# Patient Record
Sex: FEMALE | Race: BLACK OR AFRICAN AMERICAN | NOT HISPANIC OR LATINO | Employment: STUDENT | URBAN - METROPOLITAN AREA
[De-identification: names, ages, dates, MRNs, and addresses within clinical notes are randomized per-mention and may not be internally consistent; named-entity substitution may affect disease eponyms.]

---
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
## 2022-01-06 ENCOUNTER — OFFICE VISIT (OUTPATIENT)
Dept: URGENT CARE | Age: 22
End: 2022-01-06
Payer: COMMERCIAL

## 2022-01-06 VITALS
HEART RATE: 88 BPM | OXYGEN SATURATION: 100 % | BODY MASS INDEX: 27.82 KG/M2 | RESPIRATION RATE: 16 BRPM | TEMPERATURE: 97.7 F | WEIGHT: 167 LBS | HEIGHT: 65 IN

## 2022-01-06 DIAGNOSIS — J06.9 VIRAL URI: Primary | ICD-10-CM

## 2022-01-06 PROCEDURE — U0003 INFECTIOUS AGENT DETECTION BY NUCLEIC ACID (DNA OR RNA); SEVERE ACUTE RESPIRATORY SYNDROME CORONAVIRUS 2 (SARS-COV-2) (CORONAVIRUS DISEASE [COVID-19]), AMPLIFIED PROBE TECHNIQUE, MAKING USE OF HIGH THROUGHPUT TECHNOLOGIES AS DESCRIBED BY CMS-2020-01-R: HCPCS | Performed by: FAMILY MEDICINE

## 2022-01-06 PROCEDURE — 99213 OFFICE O/P EST LOW 20 MIN: CPT | Performed by: FAMILY MEDICINE

## 2022-01-06 RX ORDER — BUPROPION HYDROCHLORIDE 100 MG/1
100 TABLET ORAL DAILY
COMMUNITY

## 2022-01-06 RX ORDER — PROPRANOLOL HYDROCHLORIDE 60 MG/1
60 TABLET ORAL 2 TIMES DAILY
COMMUNITY

## 2022-01-06 RX ORDER — BROMPHENIRAMINE MALEATE, PSEUDOEPHEDRINE HYDROCHLORIDE, AND DEXTROMETHORPHAN HYDROBROMIDE 2; 30; 10 MG/5ML; MG/5ML; MG/5ML
10 SYRUP ORAL 3 TIMES DAILY PRN
Qty: 200 ML | Refills: 0 | Status: SHIPPED | OUTPATIENT
Start: 2022-01-06 | End: 2022-03-25

## 2022-01-06 NOTE — PROGRESS NOTES
[facility]        NAME: Amna العراقي is a 25 y o  female  : 2000    MRN: [id number]  DATE: 2022  TIME: 4:10 PM    Assessment and Plan   Viral URI [J06 9]  1  Viral URI  COVID Only -Office Collect    brompheniramine-pseudoephedrine-DM 30-2-10 MG/5ML syrup         Patient Instructions     Bromfed sent to pharmacy today    COVID swab collected today, test results pending  COVID test results are available between 3 and 5 days  Your results will come through [address]  Check MyChart and call if needed for test results  Quarantine guidelines discussed  Bromfed prescribed for congestion  OTC supplements/medications discussed, including Flonase  Follow-up with PCP in the next 1-2 days for re-evaluation if symptoms continue or worsen  Go to the ED if any fevers, unable to stay hydrated, abdominal pain, chest pain, shortness of breath, wheezing, chest tightness, cough or cold symptoms, new or worsening symptoms or other concerning symptoms  Chief Complaint     Chief Complaint   Patient presents with    COVID-19     sinus H/A, mucus in throat, occas dizziness since 24  +Vacc, tried Aleve, Benadryl         History of Present Illness     Amna العراقي is a 25 y o  female presenting to the office today for upper respiratory complaints  Symptoms have been present for 5 days, and include sore throat, headache, and congestion  She has tried Ibuprofen and Benadryl for her symptoms, some relief  Sick contacts include: Family members tested positive for COVID  Recent travel: denies    Review of Systems     Review of Systems   Constitutional: Negative for chills, fatigue and fever  HENT: Positive for congestion, sinus pressure, sinus pain and sore throat  Negative for ear discharge, ear pain and postnasal drip  Eyes: Negative for pain, discharge and itching  Respiratory: Negative for cough, chest tightness and shortness of breath      Cardiovascular: Negative for chest pain and palpitations  Gastrointestinal: Negative for abdominal pain, diarrhea, nausea and vomiting  Endocrine: Negative  Genitourinary: Negative  Negative for difficulty urinating and dysuria  Musculoskeletal: Negative for arthralgias and myalgias  Skin: Negative  Negative for rash  Allergic/Immunologic: Negative  Neurological: Positive for headaches  Negative for dizziness, syncope, light-headedness and numbness  Hematological: Negative  Psychiatric/Behavioral: Negative  Negative for agitation  All other systems reviewed and are negative  Current Medications       Current Outpatient Medications:     buPROPion (WELLBUTRIN) 100 mg tablet, Take 100 mg by mouth 2 (two) times a day, Disp: , Rfl:     propranolol (INDERAL) 60 mg tablet, Take 60 mg by mouth 3 (three) times a day, Disp: , Rfl:     brompheniramine-pseudoephedrine-DM 30-2-10 MG/5ML syrup, Take 10 mL by mouth 3 (three) times a day as needed for cough or congestion, Disp: 200 mL, Rfl: 0    Current Allergies     Allergies as of 01/06/2022    (No Known Allergies)            The following portions of the patient's history were reviewed and updated as appropriate: allergies, current medications, past family history, past medical history, past social history, past surgical history and problem list      Past Medical History:   Diagnosis Date    Anxiety     COPD (chronic obstructive pulmonary disease) ([facility] [address] )        Past Surgical History:   Procedure Laterality Date    COSMETIC SURGERY         History reviewed  No pertinent family history  Medications have been verified  Objective     Pulse 88   Temp 97 7 °F (36 5 °C)   Resp 16   Ht 5' 5" (1 651 m)   Wt 75 8 kg (167 lb)   LMP  (LMP Unknown)   SpO2 100%   BMI 27 79 kg/m²   No LMP recorded (lmp unknown)  Patient has had an injection  Physical Exam     Physical Exam  Vitals reviewed  Constitutional:       General: She is not in acute distress       Appearance: Normal appearance  She is normal weight  She is not ill-appearing  HENT:      Head: Normocephalic and atraumatic  Right Ear: Tympanic membrane, ear canal and external ear normal  No middle ear effusion  Left Ear: Tympanic membrane, ear canal and external ear normal   No middle ear effusion  Nose: Congestion present  Right Sinus: Frontal sinus tenderness present  Left Sinus: Frontal sinus tenderness present  Mouth/Throat:      Mouth: Mucous membranes are moist       Pharynx: Oropharynx is clear  Posterior oropharyngeal erythema present  No oropharyngeal exudate  Tonsils: No tonsillar exudate  Eyes:      Extraocular Movements: Extraocular movements intact  Conjunctiva/sclera: Conjunctivae normal       Pupils: Pupils are equal, round, and reactive to light  Cardiovascular:      Rate and Rhythm: Normal rate and regular rhythm  Pulses: Normal pulses  Heart sounds: Normal heart sounds  No murmur heard  Pulmonary:      Effort: Pulmonary effort is normal  No respiratory distress  Breath sounds: Normal breath sounds  No wheezing  Abdominal:      General: Abdomen is flat  Bowel sounds are normal  There is no distension  Palpations: Abdomen is soft  Tenderness: There is no abdominal tenderness  There is no guarding  Genitourinary:     General: Normal vulva  Rectum: Normal    Musculoskeletal:         General: Normal range of motion  Cervical back: Normal range of motion and neck supple  No tenderness  Lymphadenopathy:      Cervical: Cervical adenopathy present  Skin:     General: Skin is warm and dry  Capillary Refill: Capillary refill takes less than 2 seconds  Neurological:      General: No focal deficit present  Mental Status: She is alert and oriented to person, place, and time  Mental status is at baseline  Psychiatric:         Mood and Affect: Mood normal          Behavior: Behavior normal          Thought Content:  Thought content normal          Judgment: Judgment normal

## 2022-01-09 LAB — SARS-COV-2 RNA RESP QL NAA+PROBE: NEGATIVE

## 2022-01-20 ENCOUNTER — OFFICE VISIT (OUTPATIENT)
Dept: URGENT CARE | Age: 22
End: 2022-01-20
Payer: COMMERCIAL

## 2022-01-20 VITALS
BODY MASS INDEX: 28.46 KG/M2 | SYSTOLIC BLOOD PRESSURE: 120 MMHG | HEART RATE: 88 BPM | WEIGHT: 170.8 LBS | TEMPERATURE: 98 F | DIASTOLIC BLOOD PRESSURE: 60 MMHG | OXYGEN SATURATION: 100 % | RESPIRATION RATE: 20 BRPM | HEIGHT: 65 IN

## 2022-01-20 DIAGNOSIS — H65.93 BILATERAL NON-SUPPURATIVE OTITIS MEDIA: Primary | ICD-10-CM

## 2022-01-20 DIAGNOSIS — H69.93 DISORDER OF BOTH EUSTACHIAN TUBES: ICD-10-CM

## 2022-01-20 PROCEDURE — 99213 OFFICE O/P EST LOW 20 MIN: CPT | Performed by: FAMILY MEDICINE

## 2022-01-20 RX ORDER — AMOXICILLIN AND CLAVULANATE POTASSIUM 875; 125 MG/1; MG/1
1 TABLET, FILM COATED ORAL EVERY 12 HOURS SCHEDULED
Qty: 20 TABLET | Refills: 0 | Status: SHIPPED | OUTPATIENT
Start: 2022-01-20 | End: 2022-01-30

## 2022-01-20 RX ORDER — PREDNISONE 50 MG/1
50 TABLET ORAL DAILY
Qty: 5 TABLET | Refills: 0 | Status: SHIPPED | OUTPATIENT
Start: 2022-01-20 | End: 2022-01-25

## 2022-01-21 NOTE — PATIENT INSTRUCTIONS
Ear/eustachian tube massage, as demonstrated, 2-3 minutes every 2-3 hours  No ibuprofen while taking prednisone  Follow up with PCP in 3-5 days  Proceed to  ER if symptoms worsen  Eustachian Tube Dysfunction   WHAT YOU NEED TO KNOW:   What is eustachian tube dysfunction (ETD)? ETD is a condition that prevents your eustachian tubes from opening properly  It can also cause them to become blocked  Eustachian tubes connect your middle ear to the back of your nose and throat  These tubes open and allow air to flow in and out when you sneeze, swallow, or yawn  What causes or increases my risk for ETD? ETD may be caused by swelling or buildup of mucus in your eustachian tubes  Pressure can build if you travel in an airplane or go scuba diving  Allergies, a cold, or a sinus infection can cause mucus to build up  The following can also increase your risk:  · Smoking cigarettes    · GERD, chronic sinus inflammation, or a tumor in your nose or throat    · An immune system disorder    · Sleeping on your stomach    · In children, long-term use of a bottle, going to , or a condition such as a cleft palate    What are the signs and symptoms of ETD? · Fullness or pressure in your ears    · Muffled hearing, or a feeling you are hearing under water or have clogged ears    · Pain in one or both ears    · Ringing in your ears    · Popping, crackling, or clicking feeling in your ears    · Trouble keeping your balance    How is ETD diagnosed? ETD is most common in children younger than 5 years  Adults with ETD may have had it since childhood  ETD can sometimes begin in adulthood, usually because of certain medical conditions that have developed  Your healthcare provider will ask about your symptoms and when they began  He or she will examine your ears, your nose, and the back of your throat  He or she may also do a hearing test   How is ETD treated? ETD may get better on its own without any treatment   If it continues, you may need any of the following:  · Swallow, yawn, or chew gum  to help open your eustachian tubes  Your healthcare provider may also recommend you blow with your mouth shut and your nostrils pinched closed  · Air pressure devices  push air into your nose and eustachian tubes to help relieve air pressure in your ear  · Treatment for allergies  such as decongestants, antihistamines, and nasal steroids may improve ETD  They may help decrease swelling of the eustachian tubes  · A myringotomy  is surgery to make a hole in your eardrum  The hole relieves pressure and lets fluid drain from your ear  A pressure equalizing (PE) tube may be used to keep the hole open and to help drain fluid  · Tuboplasty  is a procedure to widen your eustachian tubes  When should I call my doctor? · Your symptoms do not improve or get worse  · You have a fever  · You have any hearing loss  · You have questions or concerns about your condition or care  CARE AGREEMENT:   You have the right to help plan your care  Learn about your health condition and how it may be treated  Discuss treatment options with your healthcare providers to decide what care you want to receive  You always have the right to refuse treatment  The above information is an  only  It is not intended as medical advice for individual conditions or treatments  Talk to your doctor, nurse or pharmacist before following any medical regimen to see if it is safe and effective for you  © Copyright [employer] 2021 Information is for End User's use only and may not be sold, redistributed or otherwise used for commercial purposes   All illustrations and images included in CareNotes® are the copyrighted property of [employer]  or [address] [place], [place] [address]

## 2022-01-21 NOTE — PROGRESS NOTES
[facility]        NAME: Adrianne Gordillo is a 25 y o  female  : 2000    MRN: [id number]  DATE: 2022  TIME: 7:03 PM    Assessment and Plan   Bilateral non-suppurative otitis media [H65 93]  1  Bilateral non-suppurative otitis media  amoxicillin-clavulanate (AUGMENTIN) 875-125 mg per tablet   2  Disorder of both eustachian tubes  predniSONE 50 mg tablet         Patient Instructions     Ear/eustachian tube massage, as demonstrated, 2-3 minutes every 2-3 hours  No ibuprofen while taking prednisone  Follow up with PCP in 3-5 days  Proceed to  ER if symptoms worsen  Chief Complaint     Chief Complaint   Patient presents with    Earache     pt was seen and had covid test and was negative  pt is here to be seen for her ear pressure  she feels like there is fluid behind her ears and it is painful at times  pt states she has been dealing with this for about 5 weeks total           History of Present Illness       Earache (pt was seen and had covid test and was negative  pt is here to be seen for her ear pressure  she feels like there is fluid behind her ears and it is painful at times  pt states she has been dealing with this for about 5 weeks total  )  No improvement with the Bromfed  Earache   There is pain in both ears  This is a recurrent problem  The current episode started more than 1 month ago  The problem has been gradually worsening  There has been no fever  The pain is moderate  Associated symptoms include a sore throat  Pertinent negatives include no rhinorrhea  Review of Systems   Review of Systems   Constitutional: Negative  HENT: Positive for ear pain and sore throat  Negative for rhinorrhea  Respiratory: Negative  Cardiovascular: Negative            Current Medications       Current Outpatient Medications:     brompheniramine-pseudoephedrine-DM 30-2-10 MG/5ML syrup, Take 10 mL by mouth 3 (three) times a day as needed for cough or congestion, Disp: 200 mL, Rfl: 0    buPROPion (WELLBUTRIN) 100 mg tablet, Take 100 mg by mouth 2 (two) times a day, Disp: , Rfl:     propranolol (INDERAL) 60 mg tablet, Take 60 mg by mouth 3 (three) times a day, Disp: , Rfl:     amoxicillin-clavulanate (AUGMENTIN) 875-125 mg per tablet, Take 1 tablet by mouth every 12 (twelve) hours for 10 days, Disp: 20 tablet, Rfl: 0    predniSONE 50 mg tablet, Take 1 tablet (50 mg total) by mouth daily for 5 days, Disp: 5 tablet, Rfl: 0    Current Allergies     Allergies as of 01/20/2022    (No Known Allergies)            The following portions of the patient's history were reviewed and updated as appropriate: allergies, current medications, past family history, past medical history, past social history, past surgical history and problem list      Past Medical History:   Diagnosis Date    Anxiety     COPD (chronic obstructive pulmonary disease) ([facility] [address] )        Past Surgical History:   Procedure Laterality Date    COSMETIC SURGERY         No family history on file  Medications have been verified  Objective   /60   Pulse 88   Temp 98 °F (36 7 °C)   Resp 20   Ht 5' 5" (1 651 m)   Wt 77 5 kg (170 lb 12 8 oz)   LMP  (LMP Unknown)   SpO2 100%   BMI 28 42 kg/m²   No LMP recorded (lmp unknown)  Patient has had an injection  Physical Exam     Physical Exam  Vitals and nursing note reviewed  Constitutional:       Appearance: Normal appearance  She is well-developed  HENT:      Right Ear: External ear normal  A middle ear effusion is present  Tympanic membrane is erythematous and retracted  Left Ear: External ear normal  A middle ear effusion is present  Tympanic membrane is erythematous and retracted  Nose: Nose normal       Mouth/Throat:      Pharynx: No oropharyngeal exudate  Eyes:      Conjunctiva/sclera: Conjunctivae normal    Cardiovascular:      Rate and Rhythm: Normal rate and regular rhythm  Heart sounds: Normal heart sounds   No murmur heard       Pulmonary:      Effort: Pulmonary effort is normal  No respiratory distress  Breath sounds: Normal breath sounds  No wheezing or rales  Chest:      Chest wall: No tenderness  Abdominal:      Palpations: Abdomen is soft  Musculoskeletal:         General: Normal range of motion  Cervical back: Normal range of motion and neck supple  Lymphadenopathy:      Cervical: No cervical adenopathy  Skin:     General: Skin is warm  Findings: No erythema or rash  Neurological:      Mental Status: She is alert and oriented to person, place, and time

## 2022-03-01 ENCOUNTER — TELEPHONE (OUTPATIENT)
Dept: PLASTIC SURGERY | Facility: CLINIC | Age: 22
End: 2022-03-01

## 2022-03-01 NOTE — TELEPHONE ENCOUNTER
IZABELLA FOR CLAIM ADJUSTOR TO LET US KNOW IF CLAIM IS STILL OPEN AND ACTIVE FOR MEDICAL AS PATIENT HAS APPT 3/2/2022

## 2022-03-02 ENCOUNTER — HOSPITAL ENCOUNTER (OUTPATIENT)
Dept: RADIOLOGY | Facility: HOSPITAL | Age: 22
Discharge: HOME | End: 2022-03-02
Attending: STUDENT IN AN ORGANIZED HEALTH CARE EDUCATION/TRAINING PROGRAM
Payer: COMMERCIAL

## 2022-03-02 ENCOUNTER — OFFICE VISIT (OUTPATIENT)
Dept: PLASTIC SURGERY | Facility: CLINIC | Age: 22
End: 2022-03-02
Payer: COMMERCIAL

## 2022-03-02 VITALS
DIASTOLIC BLOOD PRESSURE: 74 MMHG | HEIGHT: 65 IN | SYSTOLIC BLOOD PRESSURE: 112 MMHG | HEART RATE: 90 BPM | WEIGHT: 171.2 LBS | TEMPERATURE: 99.2 F | BODY MASS INDEX: 28.52 KG/M2

## 2022-03-02 DIAGNOSIS — M79.5 FOREIGN BODY (FB) IN SOFT TISSUE: ICD-10-CM

## 2022-03-02 DIAGNOSIS — M79.5 FOREIGN BODY (FB) IN SOFT TISSUE: Primary | ICD-10-CM

## 2022-03-02 PROCEDURE — 99203 OFFICE O/P NEW LOW 30 MIN: CPT | Performed by: STUDENT IN AN ORGANIZED HEALTH CARE EDUCATION/TRAINING PROGRAM

## 2022-03-02 PROCEDURE — 70140 X-RAY EXAM OF FACIAL BONES: CPT
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
## 2022-03-21 NOTE — QUICK NOTE
Spoke to patient's mother  Will order outpatient ultrasound of the forehead and right periorbital area to evaluate for foreign body in anticipation of surgical excision on 3/30/22

## 2022-03-23 ENCOUNTER — HOSPITAL ENCOUNTER (OUTPATIENT)
Dept: RADIOLOGY | Facility: IMAGING CENTER | Age: 22
Discharge: HOME | End: 2022-03-23
Payer: COMMERCIAL

## 2022-03-23 DIAGNOSIS — S00.85XD FOREIGN BODY OF FACE, SUBSEQUENT ENCOUNTER: ICD-10-CM

## 2022-03-23 PROCEDURE — 76536 US EXAM OF HEAD AND NECK: CPT

## 2022-03-25 NOTE — PRE-PROCEDURE INSTRUCTIONS
Pre-Surgery Instructions:   Medication Instructions    buPROPion (WELLBUTRIN) 100 mg tablet Instructed to take per normal schedule including DOS with sips water    propranolol (INDERAL) 60 mg tablet   Instructed to take per normal schedule including DOS with sips water     Spoke with pt via phone, COVID screening negative  Advised hospital location will call with arrival time night before surgery and NPO after midnight night before  Advised one vaccinated visitor is allowed to accompany pt to wait during the procedure  Instructed to leave contacts/jewelery/valuables at home  Instructed to avoid all ASA and OTC Vit/Supp 1 week prior to surgery and to avoid NSAIDs 3 days prior to surgery per anesthesia instructions  Tylenol ok to take prn  Reviewed above medication instructions and showering instructions  Patient verbalized understanding of all of the above

## 2022-03-29 PROCEDURE — NC001 PR NO CHARGE: Performed by: PHYSICIAN ASSISTANT
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
## 2022-03-30 ENCOUNTER — ANESTHESIA (OUTPATIENT)
Dept: PERIOP | Facility: HOSPITAL | Age: 22
End: 2022-03-30
Payer: COMMERCIAL

## 2022-03-30 ENCOUNTER — ANESTHESIA EVENT (OUTPATIENT)
Dept: PERIOP | Facility: HOSPITAL | Age: 22
End: 2022-03-30
Payer: COMMERCIAL

## 2022-03-30 ENCOUNTER — HOSPITAL ENCOUNTER (OUTPATIENT)
Facility: HOSPITAL | Age: 22
Setting detail: OUTPATIENT SURGERY
Discharge: HOME | End: 2022-03-30
Attending: STUDENT IN AN ORGANIZED HEALTH CARE EDUCATION/TRAINING PROGRAM | Admitting: STUDENT IN AN ORGANIZED HEALTH CARE EDUCATION/TRAINING PROGRAM
Payer: COMMERCIAL

## 2022-03-30 VITALS
BODY MASS INDEX: 28.32 KG/M2 | HEIGHT: 65 IN | OXYGEN SATURATION: 99 % | WEIGHT: 170 LBS | SYSTOLIC BLOOD PRESSURE: 115 MMHG | TEMPERATURE: 96.6 F | DIASTOLIC BLOOD PRESSURE: 66 MMHG | HEART RATE: 67 BPM | RESPIRATION RATE: 14 BRPM

## 2022-03-30 DIAGNOSIS — S00.85XD FOREIGN BODY OF FACE, SUBSEQUENT ENCOUNTER: Primary | ICD-10-CM

## 2022-03-30 DIAGNOSIS — M79.5 FOREIGN BODY (FB) IN SOFT TISSUE: ICD-10-CM

## 2022-03-30 PROBLEM — R51.9 HEADACHE: Status: ACTIVE | Noted: 2022-03-30

## 2022-03-30 PROBLEM — F32.A DEPRESSION: Status: ACTIVE | Noted: 2022-03-30

## 2022-03-30 LAB
EXT PREGNANCY TEST URINE: NEGATIVE
EXT. CONTROL: NORMAL

## 2022-03-30 PROCEDURE — 20525 RMVL FB MUSC/TDN DEEP/COMP: CPT | Performed by: STUDENT IN AN ORGANIZED HEALTH CARE EDUCATION/TRAINING PROGRAM

## 2022-03-30 PROCEDURE — 20525 RMVL FB MUSC/TDN DEEP/COMP: CPT | Performed by: PHYSICIAN ASSISTANT

## 2022-03-30 PROCEDURE — 88300 SURGICAL PATH GROSS: CPT | Performed by: SPECIALIST

## 2022-03-30 PROCEDURE — 99024 POSTOP FOLLOW-UP VISIT: CPT | Performed by: STUDENT IN AN ORGANIZED HEALTH CARE EDUCATION/TRAINING PROGRAM

## 2022-03-30 PROCEDURE — 10121 INC&RMVL FB SUBQ TISS COMP: CPT | Performed by: STUDENT IN AN ORGANIZED HEALTH CARE EDUCATION/TRAINING PROGRAM

## 2022-03-30 PROCEDURE — 10121 INC&RMVL FB SUBQ TISS COMP: CPT | Performed by: PHYSICIAN ASSISTANT

## 2022-03-30 PROCEDURE — 81025 URINE PREGNANCY TEST: CPT | Performed by: STUDENT IN AN ORGANIZED HEALTH CARE EDUCATION/TRAINING PROGRAM

## 2022-03-30 RX ORDER — FENTANYL CITRATE 50 UG/ML
INJECTION, SOLUTION INTRAMUSCULAR; INTRAVENOUS AS NEEDED
Status: DISCONTINUED | OUTPATIENT
Start: 2022-03-30 | End: 2022-03-30

## 2022-03-30 RX ORDER — GABAPENTIN 300 MG/1
300 CAPSULE ORAL ONCE
Status: COMPLETED | OUTPATIENT
Start: 2022-03-30 | End: 2022-03-30

## 2022-03-30 RX ORDER — ONDANSETRON 2 MG/ML
4 INJECTION INTRAMUSCULAR; INTRAVENOUS ONCE AS NEEDED
Status: DISCONTINUED | OUTPATIENT
Start: 2022-03-30 | End: 2022-03-30 | Stop reason: HOSPADM

## 2022-03-30 RX ORDER — PROPOFOL 10 MG/ML
INJECTION, EMULSION INTRAVENOUS AS NEEDED
Status: DISCONTINUED | OUTPATIENT
Start: 2022-03-30 | End: 2022-03-30

## 2022-03-30 RX ORDER — CEFAZOLIN SODIUM 1 G/50ML
1000 SOLUTION INTRAVENOUS ONCE
Status: COMPLETED | OUTPATIENT
Start: 2022-03-30 | End: 2022-03-30

## 2022-03-30 RX ORDER — FENTANYL CITRATE/PF 50 MCG/ML
25 SYRINGE (ML) INJECTION
Status: COMPLETED | OUTPATIENT
Start: 2022-03-30 | End: 2022-03-30

## 2022-03-30 RX ORDER — LIDOCAINE HYDROCHLORIDE 10 MG/ML
INJECTION, SOLUTION EPIDURAL; INFILTRATION; INTRACAUDAL; PERINEURAL AS NEEDED
Status: DISCONTINUED | OUTPATIENT
Start: 2022-03-30 | End: 2022-03-30

## 2022-03-30 RX ORDER — SODIUM CHLORIDE, SODIUM LACTATE, POTASSIUM CHLORIDE, CALCIUM CHLORIDE 600; 310; 30; 20 MG/100ML; MG/100ML; MG/100ML; MG/100ML
50 INJECTION, SOLUTION INTRAVENOUS CONTINUOUS
Status: DISCONTINUED | OUTPATIENT
Start: 2022-03-30 | End: 2022-03-30 | Stop reason: HOSPADM

## 2022-03-30 RX ORDER — SODIUM CHLORIDE, SODIUM LACTATE, POTASSIUM CHLORIDE, CALCIUM CHLORIDE 600; 310; 30; 20 MG/100ML; MG/100ML; MG/100ML; MG/100ML
100 INJECTION, SOLUTION INTRAVENOUS CONTINUOUS
Status: DISCONTINUED | OUTPATIENT
Start: 2022-03-30 | End: 2022-03-30 | Stop reason: HOSPADM

## 2022-03-30 RX ORDER — ONDANSETRON 2 MG/ML
INJECTION INTRAMUSCULAR; INTRAVENOUS AS NEEDED
Status: DISCONTINUED | OUTPATIENT
Start: 2022-03-30 | End: 2022-03-30

## 2022-03-30 RX ORDER — MIDAZOLAM HYDROCHLORIDE 2 MG/2ML
INJECTION, SOLUTION INTRAMUSCULAR; INTRAVENOUS AS NEEDED
Status: DISCONTINUED | OUTPATIENT
Start: 2022-03-30 | End: 2022-03-30

## 2022-03-30 RX ORDER — ACETAMINOPHEN 325 MG/1
975 TABLET ORAL ONCE
Status: COMPLETED | OUTPATIENT
Start: 2022-03-30 | End: 2022-03-30

## 2022-03-30 RX ORDER — HYDROMORPHONE HCL/PF 1 MG/ML
0.5 SYRINGE (ML) INJECTION
Status: DISCONTINUED | OUTPATIENT
Start: 2022-03-30 | End: 2022-03-30 | Stop reason: HOSPADM

## 2022-03-30 RX ORDER — OXYCODONE HYDROCHLORIDE 5 MG/1
5 TABLET ORAL EVERY 4 HOURS PRN
Status: DISCONTINUED | OUTPATIENT
Start: 2022-03-30 | End: 2022-03-30 | Stop reason: HOSPADM

## 2022-03-30 RX ORDER — DEXAMETHASONE SODIUM PHOSPHATE 10 MG/ML
INJECTION, SOLUTION INTRAMUSCULAR; INTRAVENOUS AS NEEDED
Status: DISCONTINUED | OUTPATIENT
Start: 2022-03-30 | End: 2022-03-30

## 2022-03-30 RX ADMIN — ACETAMINOPHEN 975 MG: 325 TABLET ORAL at 07:25

## 2022-03-30 RX ADMIN — Medication 25 MCG: at 09:41

## 2022-03-30 RX ADMIN — MIDAZOLAM 2 MG: 1 INJECTION INTRAMUSCULAR; INTRAVENOUS at 08:25

## 2022-03-30 RX ADMIN — Medication 25 MCG: at 09:51

## 2022-03-30 RX ADMIN — ONDANSETRON 4 MG: 2 INJECTION INTRAMUSCULAR; INTRAVENOUS at 08:47

## 2022-03-30 RX ADMIN — Medication 25 MCG: at 09:58

## 2022-03-30 RX ADMIN — DEXAMETHASONE SODIUM PHOSPHATE 8 MG: 10 INJECTION, SOLUTION INTRAMUSCULAR; INTRAVENOUS at 08:47

## 2022-03-30 RX ADMIN — FENTANYL CITRATE 25 MCG: 50 INJECTION INTRAMUSCULAR; INTRAVENOUS at 08:33

## 2022-03-30 RX ADMIN — GABAPENTIN 300 MG: 300 CAPSULE ORAL at 07:25

## 2022-03-30 RX ADMIN — LIDOCAINE HYDROCHLORIDE 50 MG: 10 INJECTION, SOLUTION EPIDURAL; INFILTRATION; INTRACAUDAL; PERINEURAL at 08:33

## 2022-03-30 RX ADMIN — PROPOFOL 150 MG: 10 INJECTION, EMULSION INTRAVENOUS at 08:33

## 2022-03-30 RX ADMIN — FENTANYL CITRATE 25 MCG: 50 INJECTION INTRAMUSCULAR; INTRAVENOUS at 08:53

## 2022-03-30 RX ADMIN — Medication 25 MCG: at 09:46

## 2022-03-30 RX ADMIN — CEFAZOLIN SODIUM 1000 MG: 1 SOLUTION INTRAVENOUS at 08:39

## 2022-03-30 RX ADMIN — SODIUM CHLORIDE, SODIUM LACTATE, POTASSIUM CHLORIDE, AND CALCIUM CHLORIDE: .6; .31; .03; .02 INJECTION, SOLUTION INTRAVENOUS at 08:26

## 2022-03-30 NOTE — ANESTHESIA PREPROCEDURE EVALUATION
Procedure:  EXCISION OF MULTIPLE FOREIGN BODIES FROM FACE, POSSIBLE BENIGN LESION EXCISION WITH COMPLEX CLOSURE AND POSSIBLE INTERMEDIATE CLOSURE (N/A Face)    Relevant Problems   NEURO/PSYCH   (+) Depression   (+) Headache        Physical Exam    Airway    Mallampati score: II  TM Distance: >3 FB  Neck ROM: full     Dental   No notable dental hx     Cardiovascular  Cardiovascular exam normal    Pulmonary  Pulmonary exam normal     Other Findings        Anesthesia Plan  ASA Score- 2     Anesthesia Type- general with ASA Monitors  Additional Monitors:   Airway Plan: LMA  Plan Factors-Exercise tolerance (METS): >4 METS  Existing labs reviewed  Patient summary reviewed  Patient is not a current smoker  Induction- intravenous  Postoperative Plan-   Planned trial extubation    Informed Consent- Anesthetic plan and risks discussed with patient  I personally reviewed this patient with the CRNA  Discussed and agreed on the Anesthesia Plan with the SANDRA Woodall
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
## 2022-03-30 NOTE — DISCHARGE INSTRUCTIONS
[facility]  Dr Kate Quintanilla [address], [address]  Phone: [phone]     Postoperative Instructions for Outpatient Surgery     These instructions are being provided by your doctor to give you basic guidelines during your post-op recovery  Please let our office know if your contact information has changed  Please call the office today for an appointment in 5 days for your first postoperative care visit  Please call my office at any time if you have drainage from incisions, increased redness or swelling, or a fever greater than 100 5  Dressings: Please apply bacitracin ointment to incision 2 times a day  Activity Restrictions: No strenuous activities  Bathing: You may shower in 24 hours  Pat incision dry  Please do not rub incision  Medications:    Resume pre-op medications     You may take tylenol, aleve, or ibuprofen for pain control

## 2022-03-30 NOTE — DISCHARGE SUMMARY
Discharge Summary - [address] 25 y o  female MRN: [id number]  Unit/Bed#: OR COMPA Encounter: [id number]    Admission Date:  3/30/22    Discharge Date: 3/30/22    Admitting Diagnosis: Foreign body (FB) in soft tissue [M79 5]    Discharge Diagnosis: same    Medical Problems             Resolved Problems  Date Reviewed: 1/6/2022    None                Attending: Victoria Lopez Physician(s): none    Procedures Performed: Multiple foreign body removals from face with intermediate clsorue    Pathology: gross    Hospital Course: Patient underwent above noted procedures without complication and was discharged with RTC in 5-7 days for suture removal     Condition at Discharge: good     Discharge instructions/Information to patient and family:   See after visit summary for information provided to patient and family  Provisions for Follow-Up Care:  See after visit summary for information related to follow-up care and any pertinent home health orders  Disposition: Home          Planned Readmission: No    Discharge Statement   I spent 10 minutes discharging the patient  This time was spent on the day of discharge  I had direct contact with the patient on the day of discharge  Additional documentation is required if more than 30 minutes were spent on discharge  Discharge Medications:  See after visit summary for reconciled discharge medications provided to patient and family

## 2022-03-30 NOTE — ANESTHESIA POSTPROCEDURE EVALUATION
Post-Op Assessment Note    CV Status:  Stable  Pain Score: 4    Pain management: adequate     Mental Status:  Alert and awake   Hydration Status:  Euvolemic   PONV Controlled:  Controlled   Airway Patency:  Patent      Post Op Vitals Reviewed: Yes      Staff: Anesthesiologist, CRNA         No complications documented      /66 (03/30/22 1010)    Temp (!) 96 6 °F (35 9 °C) (03/30/22 1010)    Pulse 67 (03/30/22 1010)   Resp 14 (03/30/22 1010)    SpO2 99 % (03/30/22 1010)

## 2022-04-05 ENCOUNTER — OFFICE VISIT (OUTPATIENT)
Dept: PLASTIC SURGERY | Facility: CLINIC | Age: 22
End: 2022-04-05

## 2022-04-05 DIAGNOSIS — M79.5 FOREIGN BODY (FB) IN SOFT TISSUE: Primary | ICD-10-CM

## 2022-05-17 ENCOUNTER — OFFICE VISIT (OUTPATIENT)
Dept: PLASTIC SURGERY | Facility: CLINIC | Age: 22
End: 2022-05-17

## 2022-05-17 DIAGNOSIS — M79.5 FOREIGN BODY (FB) IN SOFT TISSUE: Primary | ICD-10-CM

## 2022-05-17 PROCEDURE — 99024 POSTOP FOLLOW-UP VISIT: CPT | Performed by: PHYSICIAN ASSISTANT
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
## 2022-09-11 ENCOUNTER — APPOINTMENT (OUTPATIENT)
Dept: RADIOLOGY | Age: 22
End: 2022-09-11
Payer: COMMERCIAL

## 2022-09-11 ENCOUNTER — OFFICE VISIT (OUTPATIENT)
Dept: URGENT CARE | Age: 22
End: 2022-09-11
Payer: COMMERCIAL

## 2022-09-11 VITALS
DIASTOLIC BLOOD PRESSURE: 80 MMHG | WEIGHT: 155 LBS | HEIGHT: 65 IN | OXYGEN SATURATION: 99 % | HEART RATE: 83 BPM | SYSTOLIC BLOOD PRESSURE: 111 MMHG | RESPIRATION RATE: 18 BRPM | BODY MASS INDEX: 25.83 KG/M2 | TEMPERATURE: 99.8 F

## 2022-09-11 DIAGNOSIS — J40 BRONCHITIS: ICD-10-CM

## 2022-09-11 DIAGNOSIS — J40 BRONCHITIS: Primary | ICD-10-CM

## 2022-09-11 PROCEDURE — 71046 X-RAY EXAM CHEST 2 VIEWS: CPT

## 2022-09-11 PROCEDURE — 99213 OFFICE O/P EST LOW 20 MIN: CPT | Performed by: PHYSICIAN ASSISTANT

## 2022-09-11 RX ORDER — PREDNISONE 50 MG/1
50 TABLET ORAL DAILY
Qty: 5 TABLET | Refills: 0 | Status: SHIPPED | OUTPATIENT
Start: 2022-09-11 | End: 2022-09-16

## 2022-09-11 RX ORDER — BENZONATATE 100 MG/1
100 CAPSULE ORAL 3 TIMES DAILY PRN
Qty: 20 CAPSULE | Refills: 0 | Status: SHIPPED | OUTPATIENT
Start: 2022-09-11

## 2022-09-11 RX ORDER — AZITHROMYCIN 250 MG/1
TABLET, FILM COATED ORAL
Qty: 6 TABLET | Refills: 0 | Status: SHIPPED | OUTPATIENT
Start: 2022-09-11 | End: 2022-09-15

## 2022-09-11 NOTE — PROGRESS NOTES
[email or web address]        NAME: Sulma Odenr is a 25 y o  female  : 2000    MRN: [id number]  DATE: 2022  TIME: 4:19 PM    Assessment and Plan   Bronchitis [J40]  1  Bronchitis  predniSONE 50 mg tablet    azithromycin (ZITHROMAX) 250 mg tablet    benzonatate (TESSALON PERLES) 100 mg capsule    XR chest pa & lateral       Patient Instructions     Take medicine as prescribed  Follow up with PCP in 3-5 days  Proceed to  ER if symptoms worsen  Chief Complaint     Chief Complaint   Patient presents with    Cough     Pt was diagnosed with bronchitis about 3 weeks ago and nothing has changed           History of Present Illness       21yo F presents with mother c/o cough productive of thick white sputum and chest tightness x 3 weeks  She was given promethazine cough syrup and diagnosed with bronchitis during telemedicine visit  Patient reports symptoms have not resolved on promethazine  Mother would like chest xray to r/o pna  Review of Systems   Review of Systems   Constitutional: Negative for chills, fatigue and fever  HENT: Negative for congestion, ear pain and rhinorrhea  Respiratory: Positive for cough and chest tightness  Cardiovascular: Negative for chest pain and palpitations  Gastrointestinal: Negative for abdominal pain, diarrhea, nausea and vomiting           Current Medications       Current Outpatient Medications:     azithromycin (ZITHROMAX) 250 mg tablet, Take 2 tablets today then 1 tablet daily x 4 days, Disp: 6 tablet, Rfl: 0    benzonatate (TESSALON PERLES) 100 mg capsule, Take 1 capsule (100 mg total) by mouth 3 (three) times a day as needed for cough, Disp: 20 capsule, Rfl: 0    buPROPion (WELLBUTRIN) 100 mg tablet, Take 100 mg by mouth in the morning  , Disp: , Rfl:     predniSONE 50 mg tablet, Take 1 tablet (50 mg total) by mouth daily for 5 days, Disp: 5 tablet, Rfl: 0    propranolol (INDERAL) 60 mg tablet, Take 60 mg by mouth 2 (two) times a day  , Disp: , Rfl:     Current Allergies     Allergies as of 09/11/2022    (No Known Allergies)            The following portions of the patient's history were reviewed and updated as appropriate: allergies, current medications, past family history, past medical history, past social history, past surgical history and problem list      Past Medical History:   Diagnosis Date    Anxiety     Asthma        Past Surgical History:   Procedure Laterality Date    COSMETIC SURGERY      INCISION AND DRAINAGE OF WOUND      head/face/eye    MO EXC SKIN BENIG <0 5 CM FACE,FACIAL N/A 3/30/2022    Procedure: EXCISION OF MULTIPLE FOREIGN BODIES FROM FACE, BENIGN LESION EXCISION WITH E;  Surgeon: Gibson Reed MD;  Location: [facility];  Service: Plastics       No family history on file  Medications have been verified  Objective   /80   Pulse 83   Temp 99 8 °F (37 7 °C)   Resp 18   Ht 5' 5" (1 651 m)   Wt 70 3 kg (155 lb)   SpO2 99%   BMI 25 79 kg/m²   No LMP recorded  Physical Exam     Physical Exam  Constitutional:       Appearance: She is well-developed  HENT:      Head: Normocephalic  Right Ear: Hearing, tympanic membrane, ear canal and external ear normal  There is no impacted cerumen  Left Ear: Hearing, tympanic membrane, ear canal and external ear normal  There is no impacted cerumen  Nose: Mucosal edema, congestion and rhinorrhea present  Rhinorrhea is clear  Mouth/Throat:      Pharynx: No oropharyngeal exudate or posterior oropharyngeal erythema  Tonsils: No tonsillar exudate  Cardiovascular:      Rate and Rhythm: Normal rate and regular rhythm  Heart sounds: Normal heart sounds  No murmur heard  No friction rub  No gallop  Pulmonary:      Effort: Pulmonary effort is normal  No respiratory distress  Breath sounds: No stridor  Wheezing (scattered) present  No decreased breath sounds, rhonchi or rales        Comments: Harsh dry cough  Abdominal: General: Bowel sounds are normal  There is no distension  Palpations: Abdomen is soft  There is no mass  Tenderness: There is no abdominal tenderness  There is no guarding or rebound  Lymphadenopathy:      Cervical: No cervical adenopathy  Right cervical: No superficial cervical adenopathy  Left cervical: No superficial cervical adenopathy

## 2022-11-06 ENCOUNTER — OFFICE VISIT (OUTPATIENT)
Dept: URGENT CARE | Age: 22
End: 2022-11-06

## 2022-11-06 VITALS — HEIGHT: 65 IN | WEIGHT: 153 LBS | BODY MASS INDEX: 25.49 KG/M2

## 2022-11-06 DIAGNOSIS — U07.1 COVID: Primary | ICD-10-CM

## 2022-11-06 LAB
SARS-COV-2 AG UPPER RESP QL IA: POSITIVE
VALID CONTROL: ABNORMAL

## 2022-11-06 RX ORDER — GABAPENTIN 100 MG/1
100 CAPSULE ORAL 3 TIMES DAILY
COMMUNITY

## 2022-11-06 NOTE — PROGRESS NOTES
[address] [facility]        NAME: Remington Erwin is a 23 y o  female  : 2000    MRN: [id number]  DATE: 2022  TIME: 2:06 PM    Assessment and Plan   COVID [U07 1]  1  COVID  Poct Covid 19 Rapid Antigen Test         Patient Instructions     Supportive measures as discussed  Follow up with PCP in 3-5 days  Proceed to  ER if symptoms worsen  Chief Complaint     Chief Complaint   Patient presents with   • Fever     Fever, nasal congestion, cough began last night     History of Present Illness       Fever - 9 weeks to 74 years   This is a new problem  The current episode started yesterday  The problem occurs constantly  The problem has been rapidly worsening  The maximum temperature noted was 101 to 101 9 F  The temperature was taken using an oral thermometer  Associated symptoms include congestion, coughing, ear pain, headaches, muscle aches and sleepiness  Pertinent negatives include no abdominal pain, chest pain, diarrhea, nausea, rash, sore throat, urinary pain, vomiting or wheezing  She has tried acetaminophen for the symptoms  Review of Systems   Review of Systems   Constitutional: Positive for fever  HENT: Positive for congestion, ear pain, rhinorrhea, sinus pressure and sinus pain  Negative for sore throat  Respiratory: Positive for cough  Negative for wheezing  Cardiovascular: Negative for chest pain  Gastrointestinal: Negative for abdominal pain, diarrhea, nausea and vomiting  Genitourinary: Negative for dysuria  Skin: Negative for rash  Neurological: Positive for headaches           Current Medications       Current Outpatient Medications:   •  buPROPion (WELLBUTRIN) 100 mg tablet, Take 100 mg by mouth in the morning  , Disp: , Rfl:   •  gabapentin (NEURONTIN) 100 mg capsule, Take 100 mg by mouth 3 (three) times a day, Disp: , Rfl:   •  propranolol (INDERAL) 60 mg tablet, Take 60 mg by mouth 2 (two) times a day  , Disp: , Rfl:     Current Allergies     Allergies as of 11/06/2022 - Reviewed 11/06/2022   Allergen Reaction Noted   • Other Other (See Comments) 11/06/2022            The following portions of the patient's history were reviewed and updated as appropriate: allergies, current medications, past family history, past medical history, past social history, past surgical history and problem list      Past Medical History:   Diagnosis Date   • Anxiety    • Asthma        Past Surgical History:   Procedure Laterality Date   • COSMETIC SURGERY     • INCISION AND DRAINAGE OF WOUND      head/face/eye   • SC EXC SKIN BENIG <0 5 CM FACE,FACIAL N/A 03/30/2022    Procedure: EXCISION OF MULTIPLE FOREIGN BODIES FROM FACE, BENIGN LESION EXCISION WITH E;  Surgeon: Alma Carlin MD;  Location: [facility];  Service: Plastics   • TONSILLECTOMY         History reviewed  No pertinent family history  Medications have been verified  Objective   Ht 5' 5" (1 651 m)   Wt 69 4 kg (153 lb)   BMI 25 46 kg/m²   No LMP recorded  Physical Exam     Physical Exam  Constitutional:       Appearance: She is well-developed  HENT:      Head: Normocephalic  Right Ear: Hearing, tympanic membrane, ear canal and external ear normal  There is no impacted cerumen  Tympanic membrane is not erythematous or bulging  Left Ear: Hearing, tympanic membrane, ear canal and external ear normal  There is no impacted cerumen  Tympanic membrane is not erythematous or bulging  Nose: Mucosal edema, congestion and rhinorrhea present  Mouth/Throat:      Mouth: Mucous membranes are moist       Pharynx: No oropharyngeal exudate or posterior oropharyngeal erythema  Tonsils: No tonsillar exudate  Cardiovascular:      Rate and Rhythm: Normal rate and regular rhythm  Heart sounds: Normal heart sounds  No murmur heard  No friction rub  No gallop  Pulmonary:      Effort: Pulmonary effort is normal  No respiratory distress  Breath sounds: Normal breath sounds  No stridor   No decreased breath sounds, wheezing, rhonchi or rales  Abdominal:      General: Bowel sounds are normal  There is no distension  Palpations: Abdomen is soft  There is no mass  Tenderness: There is no abdominal tenderness  There is no guarding or rebound  Lymphadenopathy:      Cervical: No cervical adenopathy  Right cervical: No superficial cervical adenopathy  Left cervical: No superficial cervical adenopathy

## 2022-11-06 NOTE — LETTER
November 6, 2022     Patient: Chavez Moreno   YOB: 2000   Date of Visit: 11/6/2022       To Whom it May Concern:    Chavez Moreno was seen in my clinic on 11/6/2022  She may return to work on 11/11/22  If you have any questions or concerns, please don't hesitate to call           Sincerely,          Meg Aschoff, PA-C        CC: No Recipients

## 2023-02-13 VITALS
TEMPERATURE: 98.8 F | SYSTOLIC BLOOD PRESSURE: 128 MMHG | RESPIRATION RATE: 20 BRPM | DIASTOLIC BLOOD PRESSURE: 84 MMHG | HEART RATE: 98 BPM | OXYGEN SATURATION: 98 %

## 2023-02-14 ENCOUNTER — HOSPITAL ENCOUNTER (EMERGENCY)
Facility: HOSPITAL | Age: 23
Discharge: HOME | End: 2023-02-14
Attending: EMERGENCY MEDICINE

## 2023-02-14 ENCOUNTER — APPOINTMENT (EMERGENCY)
Dept: RADIOLOGY | Facility: HOSPITAL | Age: 23
End: 2023-02-14

## 2023-02-14 DIAGNOSIS — R07.9 CHEST PAIN: Primary | ICD-10-CM

## 2023-02-14 DIAGNOSIS — R07.89 ATYPICAL CHEST PAIN: ICD-10-CM

## 2023-02-14 LAB
ATRIAL RATE: 94 BPM
P AXIS: 78 DEGREES
PR INTERVAL: 130 MS
QRS AXIS: 87 DEGREES
QRSD INTERVAL: 96 MS
QT INTERVAL: 354 MS
QTC INTERVAL: 442 MS
T WAVE AXIS: 65 DEGREES
VENTRICULAR RATE: 94 BPM

## 2023-02-14 RX ORDER — NAPROXEN 500 MG/1
500 TABLET ORAL 2 TIMES DAILY WITH MEALS
Qty: 30 TABLET | Refills: 0 | Status: SHIPPED | OUTPATIENT
Start: 2023-02-14

## 2023-02-14 RX ORDER — KETOROLAC TROMETHAMINE 30 MG/ML
15 INJECTION, SOLUTION INTRAMUSCULAR; INTRAVENOUS ONCE
Status: COMPLETED | OUTPATIENT
Start: 2023-02-14 | End: 2023-02-14

## 2023-02-14 RX ORDER — ACETAMINOPHEN 325 MG/1
975 TABLET ORAL ONCE
Status: COMPLETED | OUTPATIENT
Start: 2023-02-14 | End: 2023-02-14

## 2023-02-14 RX ADMIN — ACETAMINOPHEN 975 MG: 325 TABLET ORAL at 00:47

## 2023-02-14 RX ADMIN — KETOROLAC TROMETHAMINE 15 MG: 30 INJECTION, SOLUTION INTRAMUSCULAR; INTRAVENOUS at 00:47

## 2023-02-14 NOTE — ED PROVIDER NOTES
History  Chief Complaint   Patient presents with   • Chest Pain     PT states she had covid in December and since then her chest feels tight  Painful to cough/sneeze     HPI     45-year-old female with no significant past medical history presents for evaluation of chest pain  Patient states she has been having constant chest pain for the past 2 to 3 months  She states pain feels like it is in the anterior chest wall  Denies any radiation of the pain  She states pain is worse if she pushes on her chest or if she coughs  Denies any worsening of pain with exertion  Denies any associated shortness of breath  Denies fevers, chills, or cough  Denies abdominal pain, nausea, vomiting, or diarrhea  Denies leg pain or leg swelling  Denies history of DVT or PE  Patient is not on OCPs  Denies any recent surgeries, hospitalizations, or prolonged immobilization  Patient has not been taking anything at home for pain  She states she did see her primary care doctor a few months ago and had pulmonary function testing performed which was negative  She has not seen her primary care doctor since then  Prior to Admission Medications   Prescriptions Last Dose Informant Patient Reported? Taking?    buPROPion (WELLBUTRIN) 100 mg tablet   Yes No   Sig: Take 100 mg by mouth in the morning     gabapentin (NEURONTIN) 100 mg capsule   Yes No   Sig: Take 100 mg by mouth 3 (three) times a day   propranolol (INDERAL) 60 mg tablet   Yes No   Sig: Take 60 mg by mouth 2 (two) times a day        Facility-Administered Medications: None       Past Medical History:   Diagnosis Date   • Anxiety    • Asthma        Past Surgical History:   Procedure Laterality Date   • COSMETIC SURGERY     • INCISION AND DRAINAGE OF WOUND      head/face/eye   • VT EXC B9 LESION MRGN XCP SK TG F/E/E/N/L/M 0 5CM/< N/A 03/30/2022    Procedure: EXCISION OF MULTIPLE FOREIGN BODIES FROM FACE, BENIGN LESION EXCISION WITH E;  Surgeon: Greta Torrez MD;  Location: [facility];  Service: Plastics   • TONSILLECTOMY         History reviewed  No pertinent family history  I have reviewed and agree with the history as documented  E-Cigarette/Vaping   • E-Cigarette Use Never User      E-Cigarette/Vaping Substances   • Nicotine No    • THC No    • CBD No    • Flavoring No    • Other No    • Unknown No      Social History     Tobacco Use   • Smoking status: Never   • Smokeless tobacco: Never   Vaping Use   • Vaping Use: Never used   Substance Use Topics   • Alcohol use: Not Currently   • Drug use: Never        Review of Systems   Constitutional: Negative for appetite change, chills and fever  HENT: Negative for congestion, rhinorrhea and sore throat  Respiratory: Negative for cough and shortness of breath  Cardiovascular: Positive for chest pain  Gastrointestinal: Negative for abdominal pain, diarrhea, nausea and vomiting  Musculoskeletal: Negative for arthralgias and myalgias  Skin: Negative for rash  Neurological: Negative for dizziness, weakness, light-headedness, numbness and headaches  All other systems reviewed and are negative  Physical Exam  ED Triage Vitals [02/13/23 2331]   Temperature Pulse Respirations Blood Pressure SpO2   98 8 °F (37 1 °C) 98 20 128/84 98 %      Temp Source Heart Rate Source Patient Position - Orthostatic VS BP Location FiO2 (%)   Oral Monitor Lying Right arm --      Pain Score       7             Orthostatic Vital Signs  Vitals:    02/13/23 2331   BP: 128/84   Pulse: 98   Patient Position - Orthostatic VS: Lying       Physical Exam  Vitals and nursing note reviewed  Constitutional:       General: She is not in acute distress  Appearance: Normal appearance  She is well-developed and normal weight  She is not ill-appearing, toxic-appearing or diaphoretic  HENT:      Head: Normocephalic and atraumatic        Right Ear: External ear normal       Left Ear: External ear normal       Nose: Nose normal       Mouth/Throat: Mouth: Mucous membranes are moist       Pharynx: Oropharynx is clear  Eyes:      Extraocular Movements: Extraocular movements intact  Conjunctiva/sclera: Conjunctivae normal    Cardiovascular:      Rate and Rhythm: Normal rate and regular rhythm  Pulses: Normal pulses  Heart sounds: Normal heart sounds  No murmur heard  No friction rub  No gallop  Pulmonary:      Effort: Pulmonary effort is normal  No respiratory distress  Breath sounds: Normal breath sounds  No decreased breath sounds, wheezing, rhonchi or rales  Abdominal:      General: There is no distension  Palpations: Abdomen is soft  Tenderness: There is no abdominal tenderness  There is no guarding or rebound  Musculoskeletal:         General: No tenderness  Cervical back: Neck supple  Right lower leg: No tenderness  No edema  Left lower leg: No tenderness  No edema  Skin:     General: Skin is warm and dry  Coloration: Skin is not pale  Findings: No erythema or rash  Neurological:      General: No focal deficit present  Mental Status: She is alert and oriented to person, place, and time  Cranial Nerves: No cranial nerve deficit  Sensory: No sensory deficit  Motor: No weakness  Psychiatric:         Mood and Affect: Mood normal          Behavior: Behavior normal          ED Medications  Medications   ketorolac (TORADOL) injection 15 mg (15 mg Intramuscular Given 2/14/23 0047)   acetaminophen (TYLENOL) tablet 975 mg (975 mg Oral Given 2/14/23 0047)       Diagnostic Studies  Results Reviewed     None                 XR chest 2 views   ED Interpretation by Ale Gan MD (02/14 0117)   No acute cardiopulmonary disease      Final Result by Stacy Muñoz MD (02/14 2915)      No acute cardiopulmonary disease                    Workstation performed: [id number]               Procedures  ECG 12 Lead Documentation Only    Date/Time: 2/17/2023 9:26 PM  Performed by: Magaly Kang MD  Authorized by: Magaly Kang MD     Indications / Diagnosis:  Chest pain  ECG reviewed by me, the ED Provider: yes    Patient location:  ED  Previous ECG:     Previous ECG:  Unavailable    Comparison to cardiac monitor: Yes    Interpretation:     Interpretation: normal    Rate:     ECG rate:  94    ECG rate assessment: normal    Rhythm:     Rhythm: sinus rhythm    Ectopy:     Ectopy: none    QRS:     QRS axis:  Normal    QRS intervals:  Normal  Conduction:     Conduction: normal    ST segments:     ST segments:  Normal  T waves:     T waves: inverted      Inverted:  V1, V2 and V3          ED Course                                       MDM     21-year-old female with no significant past medical history presents for evaluation of chest pain for the past 2-3 months  Patient is well-appearing, vitals normal   Patient has reproducible anterior chest wall tenderness  Likely musculoskeletal versus costochondritis  Will obtain EKG to rule out pericarditis  Will obtain chest x-ray to evaluate for pneumothorax  We will treat symptomatically with Toradol and Tylenol  Do not think patient needs labs at this time  Her presentation is not concerning for acs or PE  Reviewed and interpreted chest x-ray, no acute cardiopulmonary disease  Reviewed and interpreted EKG, shows normal sinus rhythm without acute ischemic changes, no findings consistent with pericarditis  Reassessed patient, she states pain is slightly improved  Given duration of symptoms, will have patient follow-up with her primary care doctor for further evaluation  Discussed with patient strict return precautions  Patient expressed understanding and was agreeable for discharge      Disposition  Final diagnoses:   Chest pain   Atypical chest pain     Time reflects when diagnosis was documented in both MDM as applicable and the Disposition within this note     Time User Action Codes Description Comment    2/14/2023  1:18 AM Johny Mackey Stephen Bright Add [R07 9] Chest pain     2/14/2023  1:19 AM Cathie Flores Add [R07 89] Atypical chest pain       ED Disposition     ED Disposition   Discharge    Condition   Stable    Date/Time   Tue Feb 14, 2023  1:17 AM    Comment   [facility] discharge to home/self care  Follow-up Information     Follow up With Specialties Details Why Angeles Marie MD Family Medicine Call in 1 day  [address] [address]  [phone]            Discharge Medication List as of 2/14/2023  1:20 AM      START taking these medications    Details   naproxen (Naprosyn) 500 mg tablet Take 1 tablet (500 mg total) by mouth 2 (two) times a day with meals, Starting Tue 2/14/2023, Normal         CONTINUE these medications which have NOT CHANGED    Details   buPROPion (WELLBUTRIN) 100 mg tablet Take 100 mg by mouth in the morning  , Historical Med      gabapentin (NEURONTIN) 100 mg capsule Take 100 mg by mouth 3 (three) times a day, Historical Med      propranolol (INDERAL) 60 mg tablet Take 60 mg by mouth 2 (two) times a day  , Historical Med           No discharge procedures on file  PDMP Review       Value Time User    PDMP Reviewed  Yes 3/30/2022  9:30 AM Bhavesh Lombardi PA-C           ED Provider  Attending physically available and evaluated [facility]  I managed the patient along with the ED Attending      Electronically Signed by         Yulisa Munguia MD  02/17/23 2121

## 2023-02-14 NOTE — ED ATTENDING ATTESTATION
2/13/2023  ILynne MD, saw and evaluated the patient  I have discussed the patient with the resident/non-physician practitioner and agree with the resident's/non-physician practitioner's findings, Plan of Care, and MDM as documented in the resident's/non-physician practitioner's note, except where noted  All available labs and Radiology studies were reviewed  I was present for key portions of any procedure(s) performed by the resident/non-physician practitioner and I was immediately available to provide assistance  At this point I agree with the current assessment done in the Emergency Department  I have conducted an independent evaluation of this patient a history and physical is as follows:    ED Course  ED Course as of 02/14/23 0407   Tue Feb 14, 2023   0113 Per resident H&P 24 YO F presents for chest pain for weeks intermittently; O: CV rrr lungs cta; abd soft I/P chest wall pain; CXR; ECG NL; symptomatic Tx with NSAIDs     Emergency Department Note- [facility] 23 y o  female MRN: [id number]    Unit/Bed#: Z6HA Encounter: [id number]    [facility] is a 23 y o  female who presents with   Chief Complaint   Patient presents with   • Chest Pain     PT states she had covid in December and since then her chest feels tight  Painful to cough/sneeze         History of Present Illness   HPI:  [facility] is a 23 y o  female who presents for evaluation of:  Weeks of intermittent chest discomfort radiating across her chest   Movement does not seem to provoke or palliate her symptoms  She denies associated nausea and vomiting  She denies associated abdominal bloating  Review of Systems   Constitutional: Negative for fatigue and fever  HENT: Negative for congestion and sore throat  Respiratory: Negative for cough and shortness of breath  Cardiovascular: Negative for chest pain and palpitations  Gastrointestinal: Negative for abdominal pain and nausea     Genitourinary: Negative for flank pain and frequency  Neurological: Negative for light-headedness and headaches  Psychiatric/Behavioral: Negative for dysphoric mood and hallucinations  All other systems reviewed and are negative  Historical Information   Past Medical History:   Diagnosis Date   • Anxiety    • Asthma      Past Surgical History:   Procedure Laterality Date   • COSMETIC SURGERY     • INCISION AND DRAINAGE OF WOUND      head/face/eye   • OH EXC B9 LESION MRGN XCP SK TG F/E/E/N/L/M 0 5CM/< N/A 03/30/2022    Procedure: EXCISION OF MULTIPLE FOREIGN BODIES FROM FACE, BENIGN LESION EXCISION WITH E;  Surgeon: Milla Graves MD;  Location: [facility];  Service: Plastics   • TONSILLECTOMY       Social History   Social History     Substance and Sexual Activity   Alcohol Use Not Currently     Social History     Substance and Sexual Activity   Drug Use Never     Social History     Tobacco Use   Smoking Status Never   Smokeless Tobacco Never     Family History: History reviewed  No pertinent family history  Meds/Allergies   PTA meds:   Prior to Admission Medications   Prescriptions Last Dose Informant Patient Reported? Taking?    buPROPion (WELLBUTRIN) 100 mg tablet   Yes No   Sig: Take 100 mg by mouth in the morning     gabapentin (NEURONTIN) 100 mg capsule   Yes No   Sig: Take 100 mg by mouth 3 (three) times a day   propranolol (INDERAL) 60 mg tablet   Yes No   Sig: Take 60 mg by mouth 2 (two) times a day        Facility-Administered Medications: None     Allergies   Allergen Reactions   • Other Other (See Comments)     "treated wood" hives and swollen throat       Objective   First Vitals:   Blood Pressure: 128/84 (02/13/23 2331)  Pulse: 98 (02/13/23 2331)  Temperature: 98 8 °F (37 1 °C) (02/13/23 2331)  Temp Source: Oral (02/13/23 2331)  Respirations: 20 (02/13/23 2331)  SpO2: 98 % (02/13/23 2331)    Current Vitals:   Blood Pressure: 128/84 (02/13/23 2331)  Pulse: 98 (02/13/23 2331)  Temperature: 98 8 °F (37 1 °C) (02/13/23 2331)  Temp Source: Oral (23)  Respirations: 20 (23)  SpO2: 98 % (23)    No intake or output data in the 24 hours ending 23 0407    Invasive Devices     None                 Physical Exam  Vitals and nursing note reviewed  Constitutional:       General: She is not in acute distress  Appearance: Normal appearance  She is well-developed  HENT:      Head: Normocephalic and atraumatic  Right Ear: External ear normal       Left Ear: External ear normal       Nose: Nose normal       Mouth/Throat:      Pharynx: No oropharyngeal exudate  Eyes:      Conjunctiva/sclera: Conjunctivae normal       Pupils: Pupils are equal, round, and reactive to light  Cardiovascular:      Rate and Rhythm: Normal rate and regular rhythm  Pulmonary:      Effort: Pulmonary effort is normal  No respiratory distress  Abdominal:      General: Abdomen is flat  There is no distension  Palpations: Abdomen is soft  Musculoskeletal:         General: No deformity  Normal range of motion  Cervical back: Normal range of motion and neck supple  Skin:     General: Skin is warm and dry  Capillary Refill: Capillary refill takes less than 2 seconds  Neurological:      General: No focal deficit present  Mental Status: She is alert and oriented to person, place, and time  Mental status is at baseline  Coordination: Coordination normal    Psychiatric:         Mood and Affect: Mood normal          Behavior: Behavior normal          Thought Content: Thought content normal          Judgment: Judgment normal            Medical Decision Makin  Acute chest discomfort: ECG to rule out pericarditis      Recent Results (from the past 36 hour(s))   ECG 12 lead    Collection Time: 23 11:34 PM   Result Value Ref Range    Ventricular Rate 94 BPM    Atrial Rate 94 BPM    ID Interval 130 ms    QRSD Interval 96 ms    QT Interval 354 ms    QTC Interval 442 ms    P Axis 78 degrees    QRS Axis 87 degrees    T Wave Axis 65 degrees     XR chest 2 views   ED Interpretation   No acute cardiopulmonary disease            Portions of the record may have been created with voice recognition software  Occasional wrong word or "sound a like" substitutions may have occurred due to the inherent limitations of voice recognition software  Read the chart carefully and recognize, using context, where substitutions have occurred            Critical Care Time  Procedures

## 2023-02-14 NOTE — DISCHARGE INSTRUCTIONS
Please call your primary care doctor to make a follow up appointment  Please take naproxen two times per day for pain

## 2023-12-09 ENCOUNTER — OFFICE VISIT (OUTPATIENT)
Dept: URGENT CARE | Age: 23
End: 2023-12-09
Payer: COMMERCIAL

## 2023-12-09 VITALS
TEMPERATURE: 97.7 F | SYSTOLIC BLOOD PRESSURE: 119 MMHG | OXYGEN SATURATION: 97 % | RESPIRATION RATE: 18 BRPM | DIASTOLIC BLOOD PRESSURE: 78 MMHG | HEIGHT: 65 IN | BODY MASS INDEX: 23.32 KG/M2 | WEIGHT: 140 LBS | HEART RATE: 82 BPM

## 2023-12-09 DIAGNOSIS — J40 BRONCHITIS: Primary | ICD-10-CM

## 2023-12-09 PROCEDURE — 99213 OFFICE O/P EST LOW 20 MIN: CPT

## 2023-12-09 RX ORDER — AZITHROMYCIN 250 MG/1
TABLET, FILM COATED ORAL
Qty: 6 TABLET | Refills: 0 | Status: SHIPPED | OUTPATIENT
Start: 2023-12-09 | End: 2023-12-13

## 2023-12-09 NOTE — PROGRESS NOTES
[facility]        NAME: Gaviota Oconnor is a 26 y.o. female  : 2000    MRN: [id number]  DATE: 2023  TIME: 6:35 PM    Assessment and Plan   Bronchitis [J40]  1. Bronchitis  azithromycin (ZITHROMAX) 250 mg tablet    dextromethorphan 15 MG/5ML syrup      Pt presents for eval of ongoing cough, congestion, PND, rhinorrhea and chest wall pain with cough after having COVID about a month ago. Denies recent fevers. No SOB/wheezing. BS clear , no airway restriction. Reproducible anterior chest wall pain. Discussed abx and symptom management. Patient Instructions       Follow up with PCP as needed    Chief Complaint     Chief Complaint   Patient presents with    Cough     Cough, nasal congestion, chest congestion x 2 weeks         History of Present Illness       Pt presents for eval of ongoing cough, congestion, PND, rhinorrhea and chest wall pain with cough after having COVID about a month ago. Denies recent fevers. No SOB/wheezing. BS clear , no airway restriction. Reproducible anterior chest wall pain. Discussed abx and symptom management. Cough  The current episode started 1 to 4 weeks ago. The problem has been waxing and waning. The problem occurs constantly. The cough is Productive of sputum. Associated symptoms include nasal congestion, postnasal drip, rhinorrhea, a sore throat, shortness of breath and weight loss. Pertinent negatives include no chest pain, chills, ear congestion, ear pain, fever, headaches, heartburn, hemoptysis, myalgias, rash, sweats or wheezing. The symptoms are aggravated by cold air, exercise and lying down. Review of Systems   Review of Systems   Constitutional:  Positive for weight loss. Negative for chills and fever. HENT:  Positive for postnasal drip, rhinorrhea and sore throat. Negative for ear pain. Respiratory:  Positive for cough and shortness of breath. Negative for hemoptysis and wheezing. Cardiovascular:  Negative for chest pain. Gastrointestinal:  Negative for heartburn. Musculoskeletal:  Negative for myalgias. Skin:  Negative for rash. Neurological:  Negative for headaches. Current Medications       Current Outpatient Medications:     azithromycin (ZITHROMAX) 250 mg tablet, Take 2 tablets today then 1 tablet daily x 4 days, Disp: 6 tablet, Rfl: 0    buPROPion (WELLBUTRIN) 100 mg tablet, Take 100 mg by mouth in the morning  , Disp: , Rfl:     dextromethorphan 15 MG/5ML syrup, Take 10 mL (30 mg total) by mouth 4 (four) times a day as needed for cough, Disp: 120 mL, Rfl: 0    propranolol (INDERAL) 60 mg tablet, Take 60 mg by mouth 2 (two) times a day  , Disp: , Rfl:     gabapentin (NEURONTIN) 100 mg capsule, Take 100 mg by mouth 3 (three) times a day, Disp: , Rfl:     naproxen (Naprosyn) 500 mg tablet, Take 1 tablet (500 mg total) by mouth 2 (two) times a day with meals (Patient not taking: Reported on 12/9/2023), Disp: 30 tablet, Rfl: 0    Current Allergies     Allergies as of 12/09/2023 - Reviewed 12/09/2023   Allergen Reaction Noted    Other Other (See Comments) 11/06/2022            The following portions of the patient's history were reviewed and updated as appropriate: allergies, current medications, past family history, past medical history, past social history, past surgical history and problem list.     Past Medical History:   Diagnosis Date    Anxiety     Asthma        Past Surgical History:   Procedure Laterality Date    COSMETIC SURGERY      INCISION AND DRAINAGE OF WOUND      head/face/eye    IA EXC B9 LESION MRGN XCP SK TG F/E/E/N/L/M 0.5CM/< N/A 03/30/2022    Procedure: EXCISION OF MULTIPLE FOREIGN BODIES FROM FACE, BENIGN LESION EXCISION WITH E;  Surgeon: Malcom Lazo MD;  Location: [facility];  Service: Plastics    TONSILLECTOMY         History reviewed. No pertinent family history. Medications have been verified.         Objective   /78   Pulse 82   Temp 97.7 °F (36.5 °C)   Resp 18   Ht 5' 5" (1.651 m)   Wt 63.5 kg (140 lb)   SpO2 97%   BMI 23.30 kg/m²   No LMP recorded. Physical Exam     Physical Exam  Vitals reviewed. Constitutional:       Appearance: Normal appearance. HENT:      Right Ear: Tympanic membrane normal.      Left Ear: Tympanic membrane normal.      Nose: Congestion and rhinorrhea present. Cardiovascular:      Rate and Rhythm: Normal rate and regular rhythm. Pulses: Normal pulses. Heart sounds: Normal heart sounds. Pulmonary:      Effort: Pulmonary effort is normal. No respiratory distress. Breath sounds: Normal breath sounds. No wheezing. Chest:      Chest wall: Tenderness present. Lymphadenopathy:      Cervical: No cervical adenopathy. Neurological:      Mental Status: She is alert.

## 2024-01-31 ENCOUNTER — OFFICE VISIT (OUTPATIENT)
Dept: PLASTIC SURGERY | Facility: CLINIC | Age: 24
End: 2024-01-31

## 2024-01-31 DIAGNOSIS — M79.5 FOREIGN BODY (FB) IN SOFT TISSUE: Primary | ICD-10-CM
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
## 2024-02-01 ENCOUNTER — PREP FOR PROCEDURE (OUTPATIENT)
Dept: PLASTIC SURGERY | Facility: CLINIC | Age: 24
End: 2024-02-01

## 2024-02-01 DIAGNOSIS — M79.5 FOREIGN BODY (FB) IN SOFT TISSUE: Primary | ICD-10-CM

## 2024-03-06 PROCEDURE — NC001 PR NO CHARGE: Performed by: PHYSICIAN ASSISTANT

## 2024-03-07 ENCOUNTER — HOSPITAL ENCOUNTER (OUTPATIENT)
Facility: HOSPITAL | Age: 24
Setting detail: OUTPATIENT SURGERY
Discharge: HOME | End: 2024-03-07
Attending: STUDENT IN AN ORGANIZED HEALTH CARE EDUCATION/TRAINING PROGRAM | Admitting: STUDENT IN AN ORGANIZED HEALTH CARE EDUCATION/TRAINING PROGRAM
Payer: COMMERCIAL

## 2024-03-07 VITALS
DIASTOLIC BLOOD PRESSURE: 89 MMHG | SYSTOLIC BLOOD PRESSURE: 137 MMHG | BODY MASS INDEX: 22.16 KG/M2 | HEIGHT: 65 IN | HEART RATE: 86 BPM | TEMPERATURE: 97.9 F | OXYGEN SATURATION: 95 % | WEIGHT: 133 LBS | RESPIRATION RATE: 16 BRPM

## 2024-03-07 LAB
EXT PREGNANCY TEST URINE: NEGATIVE
EXT. CONTROL: NORMAL

## 2024-03-07 PROCEDURE — 20525 RMVL FB MUSC/TDN DEEP/COMP: CPT | Performed by: STUDENT IN AN ORGANIZED HEALTH CARE EDUCATION/TRAINING PROGRAM

## 2024-03-07 PROCEDURE — 20525 RMVL FB MUSC/TDN DEEP/COMP: CPT | Performed by: PHYSICIAN ASSISTANT

## 2024-03-07 PROCEDURE — 81025 URINE PREGNANCY TEST: CPT | Performed by: STUDENT IN AN ORGANIZED HEALTH CARE EDUCATION/TRAINING PROGRAM

## 2024-03-07 RX ORDER — HYDROXYZINE HYDROCHLORIDE 25 MG/1
25 TABLET, FILM COATED ORAL DAILY PRN
COMMUNITY
Start: 2024-01-26

## 2024-03-07 RX ORDER — ACETAMINOPHEN 325 MG/1
975 TABLET ORAL ONCE
Status: COMPLETED | OUTPATIENT
Start: 2024-03-07 | End: 2024-03-07

## 2024-03-07 RX ORDER — BUPROPION HYDROCHLORIDE 150 MG/1
150 TABLET, EXTENDED RELEASE ORAL DAILY
COMMUNITY
Start: 2024-01-06

## 2024-03-07 RX ORDER — GABAPENTIN 300 MG/1
300 CAPSULE ORAL ONCE
Status: COMPLETED | OUTPATIENT
Start: 2024-03-07 | End: 2024-03-07

## 2024-03-07 RX ORDER — BALANCED SALT SOLUTION 6.4; .75; .48; .3; 3.9; 1.7 MG/ML; MG/ML; MG/ML; MG/ML; MG/ML; MG/ML
SOLUTION OPHTHALMIC AS NEEDED
Status: DISCONTINUED | OUTPATIENT
Start: 2024-03-07 | End: 2024-03-07 | Stop reason: HOSPADM

## 2024-03-07 RX ORDER — LIDOCAINE HYDROCHLORIDE AND EPINEPHRINE 10; 10 MG/ML; UG/ML
INJECTION, SOLUTION INFILTRATION; PERINEURAL AS NEEDED
Status: DISCONTINUED | OUTPATIENT
Start: 2024-03-07 | End: 2024-03-07 | Stop reason: HOSPADM

## 2024-03-07 RX ORDER — GINSENG 100 MG
CAPSULE ORAL AS NEEDED
Status: DISCONTINUED | OUTPATIENT
Start: 2024-03-07 | End: 2024-03-07 | Stop reason: HOSPADM

## 2024-03-07 RX ADMIN — GABAPENTIN 300 MG: 300 CAPSULE ORAL at 06:19

## 2024-03-07 RX ADMIN — ACETAMINOPHEN 975 MG: 325 TABLET ORAL at 06:19
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
## 2024-03-07 NOTE — DISCHARGE INSTR - AVS FIRST PAGE
Keep area dry for 24 hours after surgery.  Apply bacitracin ointment to the incision 2-3 times daily   You may take tylenol and ibuprofen for pain relief

## 2024-03-07 NOTE — OP NOTE
OPERATIVE REPORT  PATIENT NAME: Nyla Russo    :  2000  MRN: [id number]  Pt Location:  OR ROOM 08    SURGERY DATE: 3/7/2024    Surgeons and Role:     * Jean Savage MD - Primary     * Arnaud Green PA-C - Assisting    Preop Diagnosis:  Foreign body (FB) in soft tissue [M79.5]    Post-Op Diagnosis Codes:     * Foreign body (FB) in soft tissue [M79.5]    Procedure(s):  Removal of foreign body (glass fragment 5x4 mm) in left nasalis muscle (side of nose) deep and complicated (CPT 47640) with intermediate closure (total length 1.5 cm)     Specimen(s):  * No specimens in log *    Estimated Blood Loss:   Minimal    Drains:  * No LDAs found *    Anesthesia Type:   Local    Operative Indications:  Foreign body (FB) in soft tissue [M79.5]  Glass fragment in left side of nose    Operative Findings:  Glass fragment 5x4 mm within the left nasalis muscle, entrapped in scar tissue  Intermediate closure total length 1.5 cm  Total local 1% lidocaine with epi 3 cc    Complications:   None    Procedure and Technique:  Patient was brought to the operating room, transferred to the operating table in supine fashion. A timeout was performed at which point all patient identifiers were deemed to be correct. The face was prepped and draped in the normal sterile fashion. The area of the lesion was marked followed by local infiltration with local anesthetic. After allowing for the local anesthetic to take effect, an incision was made in the prior scar and dissection continued deep through skin, subcutaneous tissue, encountering the nasalis muscle with thickened scar tissue within the muscle. The thickened scar tissue encapsulated the glass fragment. This was carefully dissected free from the surrounding muscle so as not to injure the remaining muscle. The glass fragment was removed completely. The operative field was irrigated and hemostasis was achieved with electrocautery. I then proceeded with intermediate closure with closure  of dermis with 4-0 vicryl followed by 5-0 interrupted nylon for the skin. Bacitracin was applied to the incision.     This concluded the procedure. Patient tolerated the procedure well without complications. At the end of the case, all sponge, needle, and instrument counts were correct. Patient was awakened from anesthesia and taken to the PACU in stable condition.    I was present for the entire procedure, A qualified resident physician was not available and A physician assistant was required during the procedure for retraction, tissue handling, dissection and suturing        Patient Disposition:  PACU         SIGNATURE: Jean Savage MD  DATE: March 7, 2024  TIME: 8:11 AM

## 2024-03-14 ENCOUNTER — OFFICE VISIT (OUTPATIENT)
Dept: PLASTIC SURGERY | Facility: CLINIC | Age: 24
End: 2024-03-14

## 2024-03-14 DIAGNOSIS — M79.5 FOREIGN BODY (FB) IN SOFT TISSUE: Primary | ICD-10-CM

## 2024-03-14 PROCEDURE — 99024 POSTOP FOLLOW-UP VISIT: CPT

## 2024-03-14 NOTE — PROGRESS NOTES
[facility]  [address], [address], [place] [address]  [phone]    Patient Identification: Nyla Russo is a 23 y.o. female     History of Present Illness: The patient is a 23 y.o.  year-old female  who presents to the office for suture removal. Patient is 7 days s/p Excision Of Glass Fragment From Face With Complex Closure.  on 3/7/2024 by Dr. Savage.  Patient is doing well at this time. Denies fevers, chills signs of infection or pain. Applying bacitracin daily  Patient has no complaints at this time.    Past Medical History:   Diagnosis Date    Anxiety     Asthma           Review of Systems  Constitutional: Denies fevers, chills or pain.  Skin: Denies any warmth, erythema, edema or mucopurulent drainage.     Physical Exam    Left nasal ala: Surgical incisions are clean, dry, and intact. Sutures removed. Skin perfusion is intact. Expected amount of post-operative edema. There are no signs of infection, obvious hematoma, seroma or wound dehiscence.    Assessment and Plan:  The patient is an 23 y.o.  year-old female who presents to the office for suture removal. Patient is 7 days s/p Excision Of Glass Fragment From Face With Complex Closure.  on 3/7/2024 by Dr. Savage    -At today's visit sutures removed. Area is healing well.  -Patient to apply Aquaphor daily.   -Discussed daily scar massage and use of silicone scar gel/tape to promote scar softening and flattening. May begin this next week. The patient was educated on scar care and that it can take up to 1 year for full scar maturation.  -The patient is to return in 2-3 weeks for scar check or as needed at this time.  -The patient is to call the office with any questions or concerns. All of the patient's questions were answered at this time and they agree with the plan of care.      Vianca Dove PA-C  [facility]

## (undated) DEVICE — LIGHT GLOVE GREEN

## (undated) DEVICE — TUBING SUCTION 5MM X 12 FT

## (undated) DEVICE — ELECTRODE NEEDLE MOD E-Z CLEAN 2.75IN 7CM -0013M

## (undated) DEVICE — SPONGE 4 X 4 XRAY 16 PLY STRL LF RFD

## (undated) DEVICE — ELECTRODE EZ CLEAN BLADE -0012

## (undated) DEVICE — INTENDED FOR TISSUE SEPARATION, AND OTHER PROCEDURES THAT REQUIRE A SHARP SURGICAL BLADE TO PUNCTURE OR CUT.: Brand: BARD-PARKER ® SAFETYLOCK CARBON RIB-BACK BLADES

## (undated) DEVICE — BASIC SINGLE BASIN-LF: Brand: MEDLINE INDUSTRIES, INC.

## (undated) DEVICE — SUT ETHILON 5-0 PS-2 18 IN 1666H

## (undated) DEVICE — STERILE POLYISOPRENE POWDER-FREE SURGICAL GLOVES WITH EMOLLIENT COATING: Brand: PROTEXIS

## (undated) DEVICE — NEEDLE 25G X 1 1/2

## (undated) DEVICE — SYRINGE 10ML LL CONTROL TOP

## (undated) DEVICE — GAUZE SPONGES,16 PLY: Brand: CURITY

## (undated) DEVICE — BASIC PACK: Brand: CONVERTORS

## (undated) DEVICE — SOLUTION BOWL: Brand: KENDALL

## (undated) DEVICE — 1820 FOAM BLOCK NEEDLE COUNTER: Brand: DEVON

## (undated) DEVICE — SYRINGE 30ML LL

## (undated) DEVICE — SKIN MARKER DUAL TIP WITH RULER CAP, FLEXIBLE RULER AND LABELS: Brand: DEVON

## (undated) DEVICE — PACK PBDS PLASTIC HEAD AND NECK RF

## (undated) DEVICE — NEPTUNE E-SEP SMOKE EVACUATION PENCIL, COATED, 70MM BLADE, PUSH BUTTON SWITCH: Brand: NEPTUNE E-SEP

## (undated) DEVICE — ELECTRODE NEEDLE MEGAFINE 2IN E-Z CLEAN MEGADYNE -0118

## (undated) DEVICE — GLOVE SRG LF STRL BGL SKNSNS 6.5 PF

## (undated) DEVICE — STERILE POLYISOPRENE POWDER-FREE SURGICAL GLOVES: Brand: PROTEXIS

## (undated) DEVICE — ELECTRODE BLADE MOD E-Z CLEAN  2.75IN 7CM -0012AM

## (undated) DEVICE — DISPOSABLE OR TOWEL: Brand: CARDINAL HEALTH

## (undated) DEVICE — NEEDLE BLUNT 18 G X 1 1/2IN

## (undated) DEVICE — GLOVE SRG BIOGEL 6.5

## (undated) DEVICE — DECANTER: Brand: UNBRANDED